# Patient Record
Sex: FEMALE | Race: WHITE | NOT HISPANIC OR LATINO | URBAN - METROPOLITAN AREA
[De-identification: names, ages, dates, MRNs, and addresses within clinical notes are randomized per-mention and may not be internally consistent; named-entity substitution may affect disease eponyms.]

---
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
## 2019-01-02 ENCOUNTER — OFFICE (OUTPATIENT)
Dept: URBAN - METROPOLITAN AREA CLINIC 78 | Facility: CLINIC | Age: 19
End: 2019-01-02

## 2019-01-02 VITALS
TEMPERATURE: 97.6 F | HEIGHT: 63 IN | HEART RATE: 82 BPM | DIASTOLIC BLOOD PRESSURE: 76 MMHG | SYSTOLIC BLOOD PRESSURE: 117 MMHG | WEIGHT: 107 LBS

## 2019-01-02 DIAGNOSIS — R19.7 DIARRHEA, UNSPECIFIED: ICD-10-CM

## 2019-01-02 DIAGNOSIS — K50.00 CROHN'S DISEASE OF SMALL INTESTINE WITHOUT COMPLICATIONS: ICD-10-CM

## 2019-01-02 PROCEDURE — 99214 OFFICE O/P EST MOD 30 MIN: CPT

## 2019-01-02 NOTE — SERVICEHPINOTES
19 yo female presents with her mother for f/u Crohn's disease. She is here for f/u as she would like to be followed here at this point. She apparently called in for budesonide script here in late November. She thinks she started with 2 a day and quickly tapered to one per day as she was feeling better on this. She currently is taking 1 per day and feeling well. No abdominal pain. She has 1-2 formed BMs per day, type 3-4 on B.S.S. No diarrhea or blood in stools. She denies fevers, joint pains, eye symptoms, fevers, weight loss, rashes.Prior hx: Patient was ultimately diagnosed in June of 2012 but had various symptoms for several years prior to that. She had an EGD and colonoscopy in 2008 or 2009 (age 8) and they had suspected Crohn's based on gross appearance but biopsies were not definitive and she was told she didn't have Crohn's. At that time she was having stomach aches and failure to thrive. In the spring of 2012 she was having weight loss, bloody stools, vomiting, fatigue, odynophagia. She was hospitalized and diagnosed with Crohn's, was treated with prednisone for about 3 months, then 6-mp and Pentasa. Symptoms improved but she had a lot of stomach aches and headaches. Flared again the following spring of 2013. At that point her mother decided to take a more holistic pathway with dietary changes. Her mother is a dietician and has clinical research background. Patient ultimately went off her meds and went gluten-free, dairy free except for aged cheese. She went on SCD (specific carbohydrate diet). She was started on Remicade in fall 2015 and was being followed by GI in [place]. She felt better in terms of Crohn's but due to joint pain she was switched to Humira, but she did not feel that the Humira was effective. Discussed Stelara with her GI in [place] but started budesonide instead in February 2017. Ended up doing better on that, tapered gradually over approximately 6+ months. Last colonoscopy was in November 2015 when on Remicade - apparently still had Crohn's findings (but had not been on Remicade for very long - possibly only 2 months). Reports good results on fecal calprotectin while on budesonide - level was 25 whereas was in 400 range prior to starting the budesonide.BR

## 2020-06-12 ENCOUNTER — TELEHEALTH PROVIDED OTHER THAN IN PATIENT'S HOME (OUTPATIENT)
Dept: URBAN - METROPOLITAN AREA TELEHEALTH 3 | Facility: TELEHEALTH | Age: 20
End: 2020-06-12

## 2020-06-12 VITALS — WEIGHT: 105 LBS | HEIGHT: 63 IN

## 2020-06-12 DIAGNOSIS — K50.00 CROHN'S DISEASE OF SMALL INTESTINE WITHOUT COMPLICATIONS: ICD-10-CM

## 2020-06-12 DIAGNOSIS — R19.7 DIARRHEA, UNSPECIFIED: ICD-10-CM

## 2020-06-12 PROCEDURE — 99214 OFFICE O/P EST MOD 30 MIN: CPT | Mod: 95 | Performed by: PHYSICIAN ASSISTANT

## 2020-06-12 RX ORDER — BUDESONIDE 3 MG/1
CAPSULE ORAL
Qty: 30 | Refills: 1 | Status: COMPLETED
End: 2023-05-17

## 2020-06-12 NOTE — SERVICEHPINOTES
PATIENT VERIFIED BY DATE OF BIRTH AND NAME. Patient has been consented for this telecommunication visit.20 yo female presents for f/u Crohn's disease. Her mother is very involved in patient's medical care. She has been seen very intermittently for a number of years now, sometimes seeing other providers as well. Overall compliance has seemed low in terms of following recommendations and follow-up appointments, etc. See detailed clinical history below.Patient is wanting to get a refill of budesonide - has been taking 3 mg daily since at least early 2019 and feels it helps keep her symptoms at bay. She has tried tapering off of it but feels worse when she is not taking it. She is also seeing another provider (not a GI) who she says is prescribing low dose naltrexone for her (as of early this year). Patient reports doing fecal calpro levels through that provider and says they have been better.   She has 1 formed BM per day. No diarrhea or blood in stools. She denies fevers, joint pains, eye symptoms, fevers, rashes. Can have sore muscles at times. Weight is stable/slightly lower than last year.ROS otherwise negative.Clinical history: Patient was ultimately diagnosed in June of 2012 but had various symptoms for several years prior to that. She had an EGD and colonoscopy in 2008 or 2009 (age 8) and they had suspected Crohn's based on gross appearance but biopsies were not definitive and she was told she didn't have Crohn's. At that time she was having stomach aches and failure to thrive. In the spring of 2012 she was having weight loss, bloody stools, vomiting, fatigue, odynophagia. She was hospitalized and diagnosed with Crohn's, was treated with prednisone for about 3 months, then 6-mp and Pentasa. Symptoms improved but she had a lot of stomach aches and headaches. Flared again the following spring of 2013. At that point her mother decided to take a more holistic pathway with dietary changes. Her mother is a dietician and has clinical research background. Patient ultimately went off her meds and went gluten-free, dairy free except for aged cheese. She went on SCD (specific carbohydrate diet). She was started on Remicade in fall 2015 and was being followed by GI in [place]. She felt better in terms of Crohn's but due to joint pain she was switched to Humira, but she did not feel that the Humira was effective. Discussed Stelara with her GI in [place] but started budesonide instead in February 2017. Ended up doing better on that, tapered gradually over approximately 6+ months. Last colonoscopy was in November 2015 when on Remicade - apparently still had Crohn's findings (but had not been on Remicade for very long - possibly only 2 months). Reports good results on fecal calprotectin while on budesonide - level was 25 whereas was in 400 range prior to starting the budesonide. 3/29/19 fecal calpro 471BR1/17/19 WBC 11.8, Hgb 12.3, MCV 85, plt 365, CMP ok, iron sat 13%, TIBC 310, CRP 0.2, ferritin 11, Vit B12 1168, Vit D 59.6

## 2020-06-12 NOTE — SERVICENOTES
I have reviewed the history, physical exam, assessment and management plans.  I concur with or have edited all elements of her note.

Patient's visit was conducted through phone communication. Patient consented before the start of visit as to understanding of privacy concerns, possible technological failure, and their responsibility of carrying out instructions of plan.

## 2023-05-17 ENCOUNTER — OFFICE (OUTPATIENT)
Dept: URBAN - METROPOLITAN AREA CLINIC 79 | Facility: CLINIC | Age: 23
End: 2023-05-17

## 2023-05-17 VITALS
HEART RATE: 88 BPM | HEIGHT: 63 IN | WEIGHT: 114 LBS | DIASTOLIC BLOOD PRESSURE: 74 MMHG | SYSTOLIC BLOOD PRESSURE: 113 MMHG | TEMPERATURE: 98.8 F

## 2023-05-17 DIAGNOSIS — K50.00 CROHN'S DISEASE OF SMALL INTESTINE WITHOUT COMPLICATIONS: ICD-10-CM

## 2023-05-17 PROCEDURE — 99214 OFFICE O/P EST MOD 30 MIN: CPT | Performed by: PHYSICIAN ASSISTANT

## 2023-05-17 NOTE — SERVICEHPINOTES
21 yo female presents for f/u Crohn's disease. She is now more independent in terms of her medical care and is wanting to have her Crohn's followed up. Her mother had previously accompanied her and was very involved in her care. Patient was last seen in June 2020. She has been seen very intermittently for a number of years now, sometimes seeing other providers as well. Overall compliance has seemed low in terms of following recommendations and follow-up appointments, etc. Patient indicates that this was in part related to her mother's involvement, but patient wants to establish consistent care at this point. 
alpa yuen She had been on low dose budesonide and low dose naltrexone in 2020 but thinks she was off this by later that year or the next year. 
alpa yuen MR enterography in 2020 showed signs of colitis. Fecal calpro was close to normal at that time. alpa yuen She currently reports formed stools 1-2x/day. She had a small amount of blood a few months ago which she thinks was anal irritation. She denies abdominal pain. Denies N/V/D, fevers, weight loss. alpa yuen She denies rashes, joint pains, eye symptoms. alpa yuen She took Macrobid for UTI within the past couple of months. 
alpa yuen She notes she is a  and is training for a triathlon. She is careful with her diet.alpa yuen Recent labs show ferritin of 14 but iron sat 21% and normal CBC, CMP, B12, and folate. 
alpa yuen No other concerns today. 
Clinical history:Patient was ultimately diagnosed in June of 2012 but had various symptoms for several years prior to that. She had an EGD and colonoscopy in 2008 or 2009 (age 8) and they had suspected Crohn's based on gross appearance but biopsies were not definitive and she was told she didn't have Crohn's. At that time she was having stomach aches and failure to thrive. In the spring of 2012 she was having weight loss, bloody stools, vomiting, fatigue, odynophagia. She was hospitalized and diagnosed with Crohn's, was treated with prednisone for about 3 months, then 6-mp and Pentasa. Symptoms improved but she had a lot of stomach aches and headaches. Flared again the following spring of 2013. At that point her mother decided to take a more holistic pathway with dietary changes. Her mother is a dietician and has clinical research background. Patient ultimately went off her meds and went gluten-free, dairy free except for aged cheese. She went on SCD (specific carbohydrate diet).She was started on Remicade in fall 2015 and was being followed by GI in [place]. She felt better in terms of Crohn's but due to joint pain she was switched to Humira, but she did not feel that the Humira was effective. Discussed Stelara with her GI in [place] but started budesonide instead in February 2017. Ended up doing better on that, tapered gradually over approximately 6+ months.Last colonoscopy was in November 2015 when on Remicade - apparently still had Crohn's findings (but had not been on Remicade for very long - possibly only 2 months). Reports good results on fecal calprotectin while on budesonide - level was 25 whereas was in 400 range prior to starting the budesonide.br

## 2023-07-26 ENCOUNTER — OFFICE (OUTPATIENT)
Dept: URBAN - METROPOLITAN AREA CLINIC 30 | Facility: CLINIC | Age: 23
End: 2023-07-26

## 2023-07-26 ENCOUNTER — OFFICE (OUTPATIENT)
Dept: URBAN - METROPOLITAN AREA PATHOLOGY 18 | Facility: PATHOLOGY | Age: 23
End: 2023-07-26
Payer: COMMERCIAL

## 2023-07-26 VITALS
HEART RATE: 65 BPM | HEART RATE: 95 BPM | WEIGHT: 114 LBS | RESPIRATION RATE: 14 BRPM | RESPIRATION RATE: 17 BRPM | SYSTOLIC BLOOD PRESSURE: 105 MMHG | HEART RATE: 64 BPM | HEIGHT: 63 IN | TEMPERATURE: 97.7 F | DIASTOLIC BLOOD PRESSURE: 49 MMHG | DIASTOLIC BLOOD PRESSURE: 60 MMHG | SYSTOLIC BLOOD PRESSURE: 93 MMHG | HEART RATE: 57 BPM | OXYGEN SATURATION: 98 % | OXYGEN SATURATION: 99 % | DIASTOLIC BLOOD PRESSURE: 55 MMHG | HEART RATE: 60 BPM | DIASTOLIC BLOOD PRESSURE: 54 MMHG | OXYGEN SATURATION: 100 % | SYSTOLIC BLOOD PRESSURE: 95 MMHG | HEART RATE: 70 BPM | TEMPERATURE: 96.6 F | SYSTOLIC BLOOD PRESSURE: 92 MMHG | SYSTOLIC BLOOD PRESSURE: 119 MMHG | RESPIRATION RATE: 16 BRPM | RESPIRATION RATE: 23 BRPM | SYSTOLIC BLOOD PRESSURE: 96 MMHG | SYSTOLIC BLOOD PRESSURE: 111 MMHG | DIASTOLIC BLOOD PRESSURE: 72 MMHG | DIASTOLIC BLOOD PRESSURE: 61 MMHG | DIASTOLIC BLOOD PRESSURE: 66 MMHG | RESPIRATION RATE: 18 BRPM | HEART RATE: 75 BPM

## 2023-07-26 DIAGNOSIS — K63.89 OTHER SPECIFIED DISEASES OF INTESTINE: ICD-10-CM

## 2023-07-26 DIAGNOSIS — K52.9 NONINFECTIVE GASTROENTERITIS AND COLITIS, UNSPECIFIED: ICD-10-CM

## 2023-07-26 DIAGNOSIS — K63.3 ULCER OF INTESTINE: ICD-10-CM

## 2023-07-26 PROCEDURE — 88305 TISSUE EXAM BY PATHOLOGIST: CPT | Performed by: PATHOLOGY

## 2023-07-26 NOTE — SERVICEHPINOTES
Pt presents for colonoscopy due to Crohn's disease. Currently asymptomatic. Not on medical treatment. Last exam 2015.

## 2023-08-22 ENCOUNTER — TELEHEALTH PROVIDED OTHER THAN IN PATIENT'S HOME (OUTPATIENT)
Dept: URBAN - METROPOLITAN AREA TELEHEALTH 7 | Facility: TELEHEALTH | Age: 23
End: 2023-08-22

## 2023-08-22 VITALS — WEIGHT: 115 LBS | HEIGHT: 63 IN

## 2023-08-22 DIAGNOSIS — K50.80 CROHN'S DISEASE OF BOTH SMALL AND LARGE INTESTINE WITHOUT CO: ICD-10-CM

## 2023-08-22 PROCEDURE — 99214 OFFICE O/P EST MOD 30 MIN: CPT | Mod: 95 | Performed by: PHYSICIAN ASSISTANT

## 2023-08-22 NOTE — SERVICENOTES
Patient's visit was conducted through [employer] video telecommunication. Patient consented before the start of visit as to understanding of privacy concerns, possible technological failure, and their responsibility of carrying out instructions of plan.

## 2023-08-22 NOTE — SERVICEHPINOTES
PATIENT VERIFIED BY DATE OF BIRTH AND NAME. Patient has been consented for this telecommunication visit.
alpa
br22 yo female presents for f/u Crohn's disease. She had a recent updated colonoscopy with findings c/w active Crohn's ileocolitis, with sparing in sigmoid and rectum. She is not on any medication at this time. Has extensive history, detailed below, and updated during today's visit.  br
alpa She is now more independent in terms of her medical care and is wanting to have her Crohn's followed up. Her mother had previously accompanied her and was very involved in her care. Prior to recently, patient was last seen in June 2020. She has been seen very intermittently for a number of years now, sometimes seeing other providers as well. Overall compliance has seemed low in terms of following recommendations and follow-up appointments, etc. Patient indicates that this was in part related to her mother's involvement, but patient wants to establish consistent care at this point. She had been on low dose budesonide and low dose naltrexone in 2020 but thinks she was off this by later that year or the next year. MR enterography in 2020 showed signs of colitis. Fecal calpro was close to normal at that time, though patient says that there was actually a lab mix-up and her level was likely higher. She currently reports formed stools 1-2x/day. She had a small amount of blood a few months ago which she thinks was anal irritation. She denies abdominal pain. Denies N/V/D, fevers, weight loss. She has struggled to maintain her weight somewhat, which is long-standing. She also can have fatigue and tends to have anemia at times. She denies rashes, joint pains, eye symptoms.She is a  and is stays active - completed a triathlon in June. She is careful with her diet.Labs from this past spring show ferritin of 14 but iron sat 21% and normal CBC, CMP, B12, and folate. Reports taking an OTC iron supplement. July labs show normal CRP and ESR, negative TB and vitamin D a bit high (she has stopped her supplement for now). She hasn't submitted her fecal calpro stool test yet, but states it was 2000 in the spring (done elsewhere). No other concerns today. ROS as above, otherwise negative.Additional clinical history:Patient was ultimately diagnosed in June of 2012 but had various symptoms for several years prior to that. She had an EGD and colonoscopy in 2008 or 2009 (age 8) and they had suspected Crohn's based on gross appearance but biopsies were not definitive and she was told she didn't have Crohn's. At that time she was having stomach aches and failure to thrive. In the spring of 2012 she was having weight loss, bloody stools, vomiting, fatigue, odynophagia. She was hospitalized and diagnosed with Crohn's, was treated with prednisone for about 3 months, then 6-mp and Pentasa. Symptoms improved but she had a lot of stomach aches and headaches. Flared again the following spring of 2013. At that point her mother decided to take a more holistic pathway with dietary changes. Her mother is a dietician and has clinical research background. Patient ultimately went off her meds and went gluten-free, dairy free except for aged cheese. She went on SCD (specific carbohydrate diet).She was started on Remicade in fall 2015 and was being followed by GI in [place]. She felt better in terms of Crohn's but due to joint pain she was switched to Humira, but she did not feel that the Humira was effective. Discussed Stelara with her GI in [place] but started budesonide instead in February 2017. Ended up doing better on that, tapered gradually over approximately 6+ months.Last colonoscopy (until recently) was in November 2015 when on Remicade - apparently still had Crohn's findings (but had not been on Remicade for very long - possibly only 2 months). Reports good results on fecal calprotectin while on budesonide - level was 25 whereas was in 400 range prior to starting the budesonide.br

## 2023-09-25 ENCOUNTER — OFFICE (OUTPATIENT)
Dept: URBAN - METROPOLITAN AREA CLINIC 34 | Facility: CLINIC | Age: 23
End: 2023-09-25
Payer: COMMERCIAL

## 2023-09-25 DIAGNOSIS — K50.80 CROHN'S DISEASE OF BOTH SMALL AND LARGE INTESTINE WITHOUT CO: ICD-10-CM

## 2023-09-25 PROCEDURE — 96413 CHEMO IV INFUSION 1 HR: CPT | Performed by: INTERNAL MEDICINE

## 2023-10-18 ENCOUNTER — OFFICE (OUTPATIENT)
Dept: URBAN - METROPOLITAN AREA CLINIC 102 | Facility: CLINIC | Age: 23
End: 2023-10-18
Payer: COMMERCIAL

## 2023-10-18 DIAGNOSIS — K50.80 CROHN'S DISEASE OF BOTH SMALL AND LARGE INTESTINE WITHOUT CO: ICD-10-CM

## 2023-10-18 PROCEDURE — 96413 CHEMO IV INFUSION 1 HR: CPT | Performed by: INTERNAL MEDICINE

## 2023-11-20 ENCOUNTER — OFFICE (OUTPATIENT)
Dept: URBAN - METROPOLITAN AREA CLINIC 34 | Facility: CLINIC | Age: 23
End: 2023-11-20
Payer: COMMERCIAL

## 2023-11-20 DIAGNOSIS — K50.80 CROHN'S DISEASE OF BOTH SMALL AND LARGE INTESTINE WITHOUT CO: ICD-10-CM

## 2023-11-20 PROCEDURE — 96413 CHEMO IV INFUSION 1 HR: CPT | Performed by: INTERNAL MEDICINE

## 2024-01-26 ENCOUNTER — OFFICE (OUTPATIENT)
Dept: URBAN - METROPOLITAN AREA CLINIC 34 | Facility: CLINIC | Age: 24
End: 2024-01-26
Payer: COMMERCIAL

## 2024-01-26 DIAGNOSIS — K50.80 CROHN'S DISEASE OF BOTH SMALL AND LARGE INTESTINE WITHOUT CO: ICD-10-CM

## 2024-01-26 PROCEDURE — 96413 CHEMO IV INFUSION 1 HR: CPT | Performed by: INTERNAL MEDICINE

## 2024-02-20 ENCOUNTER — TELEHEALTH PROVIDED OTHER THAN IN PATIENT'S HOME (OUTPATIENT)
Dept: URBAN - METROPOLITAN AREA TELEHEALTH 7 | Facility: TELEHEALTH | Age: 24
End: 2024-02-20

## 2024-02-20 VITALS — WEIGHT: 115 LBS | HEIGHT: 63 IN

## 2024-02-20 DIAGNOSIS — K50.80 CROHN'S DISEASE OF BOTH SMALL AND LARGE INTESTINE WITHOUT CO: ICD-10-CM

## 2024-02-20 PROCEDURE — 99214 OFFICE O/P EST MOD 30 MIN: CPT | Mod: 95 | Performed by: PHYSICIAN ASSISTANT

## 2024-02-20 NOTE — SERVICENOTES
Patient's visit was conducted through [employer] video telecommunication. Patient consented before the start of visit as to understanding of privacy concerns, possible technological failure, and their responsibility of carrying out instructions of plan.

## 2024-02-20 NOTE — SERVICEHPINOTES
PATIENT VERIFIED BY DATE OF BIRTH AND NAME. Patient has been consented for this telecommunication visit.
alpa yuen23 yo female presents for f/u Crohn's disease. She had an updated colonoscopy in July 2023 with findings c/w active Crohn's ileocolitis, with sparing in sigmoid and rectum. She was not on any medication at that time. Has extensive history, detailed below.
alpa yuen She started on Entyvio on 9/25 and has been doing well overall. Recent fecal calpro level is at 144, improved from previous. Reports 1-2 soft stools daily. No blood. Denies N/V/D, fevers, weight loss, rashes, joint pain, eye symptoms. 
alpa yuen She is wondering about changing location of her infusions. She is living in [place] currently. alpa Brewer is now more independent in terms of her medical care and is wanting to have her Crohn's followed up. Her mother had previously accompanied her and was very involved in her care. Prior to 2023, patient was last seen in June 2020. She has been seen very intermittently for a number of years now, sometimes seeing other providers as well. Overall compliance has seemed low in terms of following recommendations and follow-up appointments, etc. Patient indicates that this was in part related to her mother's involvement, but patient wants to establish consistent care at this point.She had been on low dose budesonide and low dose naltrexone in 2020 but thinks she was off this by later that year or the next year.MR enterography in 2020 showed signs of colitis. Fecal calpro was close to normal at that time, though patient says that there was actually a lab mix-up and her level was likely higher.
alpa Brewer is a  and is stays active - completed a triathlon in June. She is careful with her diet.Labs from this past spring show ferritin of 14 but iron sat 21% and normal CBC, CMP, B12, and folate. Reports taking an OTC iron supplement. July labs show normal CRP and ESR, negative TB and vitamin D a bit high (she has stopped her supplement for now). No other concerns today. ROS as above, otherwise negative.Additional clinical history:Patient was ultimately diagnosed in June of 2012 but had various symptoms for several years prior to that. She had an EGD and colonoscopy in 2008 or 2009 (age 8) and they had suspected Crohn's based on gross appearance but biopsies were not definitive and she was told she didn't have Crohn's. At that time she was having stomach aches and failure to thrive. In the spring of 2012 she was having weight loss, bloody stools, vomiting, fatigue, odynophagia. She was hospitalized and diagnosed with Crohn's, was treated with prednisone for about 3 months, then 6-mp and Pentasa. Symptoms improved but she had a lot of stomach aches and headaches. Flared again the following spring of 2013. At that point her mother decided to take a more holistic pathway with dietary changes. Her mother is a dietician and has clinical research background. Patient ultimately went off her meds and went gluten-free, dairy free except for aged cheese. She went on SCD (specific carbohydrate diet).She was started on Remicade in fall 2015 and was being followed by GI in [place]. She felt better in terms of Crohn's but due to joint pain she was switched to Humira, but she did not feel that the Humira was effective. Discussed Stelara with her GI in [place] but started budesonide instead in February 2017. Ended up doing better on that, tapered gradually over approximately 6+ months.Last colonoscopy (until recently) was in November 2015 when on Remicade - apparently still had Crohn's findings (but had not been on Remicade for very long - possibly only 2 months). Reports good results on fecal calprotectin while on budesonide - level was 25 whereas was in 400 range prior to starting the budesonide. 
br
br

## 2024-03-18 ENCOUNTER — OFFICE (OUTPATIENT)
Dept: URBAN - METROPOLITAN AREA CLINIC 34 | Facility: CLINIC | Age: 24
End: 2024-03-18
Payer: COMMERCIAL

## 2024-03-18 DIAGNOSIS — K50.80 CROHN'S DISEASE OF BOTH SMALL AND LARGE INTESTINE WITHOUT CO: ICD-10-CM

## 2024-03-18 PROCEDURE — 96413 CHEMO IV INFUSION 1 HR: CPT | Performed by: INTERNAL MEDICINE

## 2024-05-20 ENCOUNTER — OFFICE (OUTPATIENT)
Dept: URBAN - METROPOLITAN AREA CLINIC 34 | Facility: CLINIC | Age: 24
End: 2024-05-20
Payer: COMMERCIAL

## 2024-05-20 DIAGNOSIS — K50.80 CROHN'S DISEASE OF BOTH SMALL AND LARGE INTESTINE WITHOUT CO: ICD-10-CM

## 2024-05-20 PROCEDURE — 96413 CHEMO IV INFUSION 1 HR: CPT | Performed by: INTERNAL MEDICINE

## 2024-07-22 ENCOUNTER — OFFICE (OUTPATIENT)
Dept: URBAN - METROPOLITAN AREA CLINIC 79 | Facility: CLINIC | Age: 24
End: 2024-07-22
Payer: COMMERCIAL

## 2024-07-22 DIAGNOSIS — K50.80 CROHN'S DISEASE OF BOTH SMALL AND LARGE INTESTINE WITHOUT CO: ICD-10-CM

## 2024-07-22 PROCEDURE — 96413 CHEMO IV INFUSION 1 HR: CPT | Performed by: INTERNAL MEDICINE

## 2024-10-31 ENCOUNTER — TELEHEALTH PROVIDED OTHER THAN IN PATIENT'S HOME (OUTPATIENT)
Dept: URBAN - METROPOLITAN AREA TELEHEALTH 7 | Facility: TELEHEALTH | Age: 24
End: 2024-10-31
Payer: COMMERCIAL

## 2024-10-31 VITALS — WEIGHT: 120 LBS | HEIGHT: 63 IN

## 2024-10-31 DIAGNOSIS — K50.80 CROHN'S DISEASE OF BOTH SMALL AND LARGE INTESTINE WITHOUT CO: ICD-10-CM

## 2024-10-31 PROCEDURE — 99214 OFFICE O/P EST MOD 30 MIN: CPT | Mod: 95 | Performed by: PHYSICIAN ASSISTANT

## 2025-03-27 ENCOUNTER — OFFICE (OUTPATIENT)
Dept: URBAN - METROPOLITAN AREA CLINIC 79 | Facility: CLINIC | Age: 25
End: 2025-03-27
Payer: COMMERCIAL

## 2025-03-27 DIAGNOSIS — K50.80 CROHN'S DISEASE OF BOTH SMALL AND LARGE INTESTINE WITHOUT CO: ICD-10-CM

## 2025-03-27 PROCEDURE — 96413 CHEMO IV INFUSION 1 HR: CPT | Performed by: HOSPITALIST

## 2025-05-27 ENCOUNTER — OFFICE (OUTPATIENT)
Dept: URBAN - METROPOLITAN AREA CLINIC 79 | Facility: CLINIC | Age: 25
End: 2025-05-27
Payer: COMMERCIAL

## 2025-05-27 DIAGNOSIS — K50.80 CROHN'S DISEASE OF BOTH SMALL AND LARGE INTESTINE WITHOUT CO: ICD-10-CM

## 2025-05-27 PROCEDURE — 96413 CHEMO IV INFUSION 1 HR: CPT | Performed by: INTERNAL MEDICINE

## 2025-06-05 ENCOUNTER — TELEHEALTH PROVIDED IN PATIENT'S HOME (OUTPATIENT)
Dept: URBAN - METROPOLITAN AREA TELEHEALTH 7 | Facility: TELEHEALTH | Age: 25
End: 2025-06-05
Payer: COMMERCIAL

## 2025-06-05 VITALS — WEIGHT: 125 LBS | HEIGHT: 63 IN

## 2025-06-05 DIAGNOSIS — K50.80 CROHN'S DISEASE OF BOTH SMALL AND LARGE INTESTINE WITHOUT CO: ICD-10-CM

## 2025-06-05 PROCEDURE — 99214 OFFICE O/P EST MOD 30 MIN: CPT | Mod: 95 | Performed by: PHYSICIAN ASSISTANT

## 2025-06-05 NOTE — SERVICENOTES
Patient was located in their home during visit., Patient's visit was conducted through [employer] video telecommunication. Patient consented before the start of visit as to understanding of privacy concerns, possible technological failure, and their responsibility of carrying out instructions of plan., I spent 15 minutes today reviewing the chart, talking with the patient, reviewing previous results with patient, discussing plan, and documenting. Patient understands and agrees with plan. Questions/concerns addressed.

## 2025-06-05 NOTE — SERVICEHPINOTES
PATIENT VERIFIED BY DATE OF BIRTH AND NAME. Patient has been consented for this telecommunication visit using [employer] application.
alpa

br24 yo female presents for f/u Crohn's disease. She has continued on Entyvio infusions q 8 weeks. alpa yuen She is doing very well. Has a normal BM daily. Denies blood in stools or abdominal pain. Denies N/V/D, fevers, weight loss, rashes, joint pain, eye symptoms.
alpa

Daniella was denied for the Entyvio pen by her insurance previously. Notes that her veins are getting scarred from the frequent infusions and blood draws. She is wondering if she can get the infusions closer to where she lives. 
alpa yuen Clinical hx:alpa yuen She had an updated colonoscopy in July 2023 with findings c/w active Crohn's ileocolitis, with sparing in sigmoid and rectum. She was not on any medication at that time. Has extensive history (see visit note from 2/20/24 for more details).She started on Entyvio on 9/25/2023. MR enterography May 2024 - mild colitis noted in transverse colon but overall significant improvement in IBD findings compared to July 2020 MRIMR enterography in 2020 showed signs of colitis. Fecal calpro was close to normal at that time, though patient says that there was actually a lab mix-up and her level was likely higher.She is a  and stays active - has done triathlons. She is careful with her diet.No other concerns today. ROS as above, otherwise negative. alpa